# Patient Record
Sex: FEMALE | Race: OTHER | HISPANIC OR LATINO
[De-identification: names, ages, dates, MRNs, and addresses within clinical notes are randomized per-mention and may not be internally consistent; named-entity substitution may affect disease eponyms.]

---

## 2021-02-17 PROBLEM — Z00.00 ENCOUNTER FOR PREVENTIVE HEALTH EXAMINATION: Status: ACTIVE | Noted: 2021-02-17

## 2021-03-08 ENCOUNTER — APPOINTMENT (OUTPATIENT)
Dept: SURGERY | Facility: CLINIC | Age: 46
End: 2021-03-08
Payer: MEDICAID

## 2021-03-08 VITALS
HEIGHT: 65 IN | SYSTOLIC BLOOD PRESSURE: 126 MMHG | TEMPERATURE: 97.4 F | HEART RATE: 80 BPM | OXYGEN SATURATION: 99 % | WEIGHT: 173 LBS | BODY MASS INDEX: 28.82 KG/M2 | DIASTOLIC BLOOD PRESSURE: 81 MMHG

## 2021-03-08 DIAGNOSIS — Z56.0 UNEMPLOYMENT, UNSPECIFIED: ICD-10-CM

## 2021-03-08 DIAGNOSIS — Z78.9 OTHER SPECIFIED HEALTH STATUS: ICD-10-CM

## 2021-03-08 DIAGNOSIS — Z84.89 FAMILY HISTORY OF OTHER SPECIFIED CONDITIONS: ICD-10-CM

## 2021-03-08 PROCEDURE — 99203 OFFICE O/P NEW LOW 30 MIN: CPT

## 2021-03-08 PROCEDURE — 99072 ADDL SUPL MATRL&STAF TM PHE: CPT

## 2021-03-08 SDOH — ECONOMIC STABILITY - INCOME SECURITY: UNEMPLOYMENT, UNSPECIFIED: Z56.0

## 2021-03-09 PROBLEM — Z78.9 NON-SMOKER: Status: ACTIVE | Noted: 2021-03-08

## 2021-03-09 PROBLEM — Z56.0 UNEMPLOYED: Status: ACTIVE | Noted: 2021-03-08

## 2021-03-09 PROBLEM — Z78.9 NO PERTINENT PAST MEDICAL HISTORY: Status: RESOLVED | Noted: 2021-03-08 | Resolved: 2021-03-09

## 2021-03-09 PROBLEM — Z84.89 FAMILY HISTORY OF DEATH OF NATURAL CAUSE: Status: ACTIVE | Noted: 2021-03-08

## 2021-03-09 RX ORDER — NAPROXEN 500 MG/1
500 TABLET ORAL
Qty: 21 | Refills: 0 | Status: ACTIVE | COMMUNITY
Start: 2020-12-18

## 2021-03-09 RX ORDER — NEOMYCIN AND POLYMYXIN B SULFATES AND DEXAMETHASONE 3.5; 10000; 1 MG/G; [IU]/G; MG/G
3.5-10000-0.1 OINTMENT OPHTHALMIC
Qty: 4 | Refills: 0 | Status: ACTIVE | COMMUNITY
Start: 2020-12-15

## 2021-03-09 RX ORDER — CLOBETASOL PROPIONATE 0.5 MG/ML
0.05 SOLUTION TOPICAL
Qty: 50 | Refills: 0 | Status: ACTIVE | COMMUNITY
Start: 2020-12-29

## 2021-03-09 RX ORDER — ONABOTULINUMTOXINA 100 [USP'U]/1
100 INJECTION, POWDER, LYOPHILIZED, FOR SOLUTION INTRADERMAL; INTRAMUSCULAR
Qty: 1 | Refills: 0 | Status: DISCONTINUED | COMMUNITY
Start: 2020-11-10 | End: 2021-03-09

## 2021-03-09 RX ORDER — BACLOFEN 10 MG/1
10 TABLET ORAL
Qty: 60 | Refills: 0 | Status: ACTIVE | COMMUNITY
Start: 2020-09-18

## 2021-03-09 RX ORDER — FLUCONAZOLE 150 MG/1
150 TABLET ORAL
Qty: 10 | Refills: 0 | Status: ACTIVE | COMMUNITY
Start: 2021-02-02

## 2021-03-09 RX ORDER — DICLOFENAC SODIUM 1% 10 MG/G
1 GEL TOPICAL
Qty: 200 | Refills: 0 | Status: DISCONTINUED | COMMUNITY
Start: 2021-03-03 | End: 2021-03-09

## 2021-03-09 RX ORDER — METRONIDAZOLE 7.5 MG/G
0.75 GEL VAGINAL
Qty: 70 | Refills: 0 | Status: ACTIVE | COMMUNITY
Start: 2021-01-14

## 2021-03-09 RX ORDER — APREMILAST 30 MG/1
30 TABLET, FILM COATED ORAL
Qty: 60 | Refills: 0 | Status: ACTIVE | COMMUNITY
Start: 2020-09-10

## 2021-03-09 RX ORDER — HALOBETASOL PROPIONATE 0.5 MG/G
0.05 CREAM TOPICAL
Qty: 50 | Refills: 0 | Status: ACTIVE | COMMUNITY
Start: 2021-03-03

## 2021-03-09 RX ORDER — TIZANIDINE 4 MG/1
4 TABLET ORAL
Qty: 15 | Refills: 0 | Status: ACTIVE | COMMUNITY
Start: 2020-12-18

## 2021-03-09 RX ORDER — TIZANIDINE 2 MG/1
2 TABLET ORAL
Qty: 15 | Refills: 0 | Status: DISCONTINUED | COMMUNITY
Start: 2020-12-18 | End: 2021-03-09

## 2021-03-09 RX ORDER — IBUPROFEN 400 MG/1
400 TABLET, FILM COATED ORAL
Qty: 30 | Refills: 0 | Status: ACTIVE | COMMUNITY
Start: 2020-11-30

## 2021-03-09 RX ORDER — VITAMIN B COMPLEX
CAPSULE ORAL
Qty: 30 | Refills: 0 | Status: ACTIVE | COMMUNITY
Start: 2020-09-09

## 2021-03-09 RX ORDER — FLUOCINONIDE 0.5 MG/ML
0.05 SOLUTION TOPICAL
Qty: 60 | Refills: 0 | Status: ACTIVE | COMMUNITY
Start: 2020-09-14

## 2021-03-09 RX ORDER — FLUTICASONE PROPIONATE 0.5 MG/G
0.05 CREAM TOPICAL
Qty: 60 | Refills: 0 | Status: DISCONTINUED | COMMUNITY
Start: 2020-09-10 | End: 2021-03-09

## 2021-03-09 RX ORDER — AMOXICILLIN 500 MG/1
500 CAPSULE ORAL
Qty: 30 | Refills: 0 | Status: ACTIVE | COMMUNITY
Start: 2020-11-30

## 2021-03-09 RX ORDER — MONTELUKAST 10 MG/1
10 TABLET, FILM COATED ORAL
Qty: 30 | Refills: 0 | Status: ACTIVE | COMMUNITY
Start: 2020-09-09

## 2021-03-09 RX ORDER — GLUCOSAMINE/CHONDR SU A SOD 750-600 MG
1000 TABLET ORAL
Qty: 30 | Refills: 0 | Status: ACTIVE | COMMUNITY
Start: 2020-09-09

## 2021-03-09 RX ORDER — ERGOCALCIFEROL 1.25 MG/1
1.25 MG CAPSULE, LIQUID FILLED ORAL
Qty: 4 | Refills: 0 | Status: ACTIVE | COMMUNITY
Start: 2020-05-15

## 2021-03-09 RX ORDER — PAROXETINE HYDROCHLORIDE 10 MG/1
10 TABLET, FILM COATED ORAL
Qty: 30 | Refills: 0 | Status: ACTIVE | COMMUNITY
Start: 2020-09-09

## 2021-03-09 RX ORDER — MEDICAL SUPPLY, MISCELLANEOUS
EACH MISCELLANEOUS
Qty: 4 | Refills: 0 | Status: ACTIVE | COMMUNITY
Start: 2020-07-24

## 2021-03-09 RX ORDER — AZELASTINE HYDROCHLORIDE 0.5 MG/ML
0.05 SOLUTION/ DROPS OPHTHALMIC
Qty: 6 | Refills: 0 | Status: ACTIVE | COMMUNITY
Start: 2021-01-14

## 2021-03-09 RX ORDER — DICLOFENAC SODIUM 100 MG/1
100 TABLET, FILM COATED, EXTENDED RELEASE ORAL
Qty: 30 | Refills: 0 | Status: ACTIVE | COMMUNITY
Start: 2020-09-18

## 2021-03-15 NOTE — CONSULT LETTER
[Dear  ___] : Dear  [unfilled], [Consult Letter:] : I had the pleasure of evaluating your patient, [unfilled]. [Consult Closing:] : Thank you very much for allowing me to participate in the care of this patient.  If you have any questions, please do not hesitate to contact me. [Sincerely,] : Sincerely, [FreeTextEntry3] : Dr Ratliff

## 2021-03-15 NOTE — PHYSICAL EXAM
[No Rash or Lesion] : No rash or lesion [Alert] : alert [Oriented to Person] : oriented to person [Oriented to Place] : oriented to place [Oriented to Time] : oriented to time [Calm] : calm [de-identified] : The patient is alert, well-groomed, well developed and cheerful.  [de-identified] : Head is normocephalic. Conjunctiva pink, anicteric. Nasal mucosa pink, septum midline. Oral mucosa pink. Tongue midline, pharynx without exudates. \par \par   [de-identified] : Neck supple. Trachea midline. Thyroid isthmus barely palpable, lobes not felt.\par   [de-identified] : Breath sounds equal and bilateral, no wheezing no rales or rhonchi  [de-identified] :  good S1, S2, no m/r/g bilateral  [de-identified] : Breasts are symmetrical. No masses; nipples without discharge. No palpable supraclavicular masses. Axillas are benign [de-identified] : Normoactive bowel sounds, soft and nontender, no hepatosplenomegaly or masses noted [de-identified] : WNL

## 2021-03-15 NOTE — HISTORY OF PRESENT ILLNESS
[de-identified] : NATHANAEL HORVATH is a 45 year old female who present in the office for initial consultation with chief complaint of having a Left breast mass that was found on sonographic finding . Patient was referred by Dr. Ag Palacio and Dr Hoskins. Patient denies any  trauma and she has no nipple discharge. Patient denies any fever, night sweats or loss of appetite. There is no family history of breast carcinoma.  Menarche at age 13 ,  -2 para -2 and her last menstrual period was one year ago. Patient is on no hormonal replacement therapy.  Findings: She had a mammogram on 2021 that  was negative She also had a sonogram of the breast  that was deemed BIRADS 4A - suspicions.  [de-identified] : \par \par \par \par

## 2021-03-15 NOTE — PLAN
[FreeTextEntry1] :  NATHANAEL HORVATH is a 45 year old female who presenting today for an evaluation. she is doing well and offers no complaints. Results of her recent imaging and physical examination findings were discussed in details. She was advised to have breast US in 3 month and return after test. Importance of monthly self-breast examination was reinforced. Patient's questions and concerns addressed to patient's satisfaction.\par \par Vitamin E daily for breast pain \par Avoid caffeine and Chocolates to prevent breast pain.\par

## 2021-08-31 ENCOUNTER — NON-APPOINTMENT (OUTPATIENT)
Age: 46
End: 2021-08-31

## 2021-08-31 DIAGNOSIS — N63.20 UNSPECIFIED LUMP IN THE LEFT BREAST, UNSPECIFIED QUADRANT: ICD-10-CM

## 2021-09-09 ENCOUNTER — APPOINTMENT (OUTPATIENT)
Dept: SURGERY | Facility: CLINIC | Age: 46
End: 2021-09-09
Payer: MEDICAID

## 2021-11-29 ENCOUNTER — APPOINTMENT (OUTPATIENT)
Dept: SURGERY | Facility: CLINIC | Age: 46
End: 2021-11-29
Payer: MEDICAID

## 2021-11-29 VITALS
HEIGHT: 65 IN | HEART RATE: 66 BPM | WEIGHT: 173 LBS | DIASTOLIC BLOOD PRESSURE: 77 MMHG | BODY MASS INDEX: 28.82 KG/M2 | SYSTOLIC BLOOD PRESSURE: 116 MMHG

## 2021-11-29 DIAGNOSIS — R92.8 OTHER ABNORMAL AND INCONCLUSIVE FINDINGS ON DIAGNOSTIC IMAGING OF BREAST: ICD-10-CM

## 2021-11-29 PROCEDURE — 99213 OFFICE O/P EST LOW 20 MIN: CPT

## 2021-11-29 NOTE — PLAN
[FreeTextEntry1] : Ms. HORVATH  is presenting  today for an evaluation .  she is doing well and offers no complaints.  Results of  her recent  imaging and physical examination findings were discussed in details.   She  was advised to have BL            breast US and Mammogram in  February 2022 and return after the tests. Importance of monthly self-breast examination was reinforced.  Patient's questions and concerns addressed to patient's satisfaction.\par

## 2021-11-29 NOTE — HISTORY OF PRESENT ILLNESS
[de-identified] : NATHANAEL HORVATH is a 45 year old female who was seen in March  with chief complaint of having a Left breast mass. She had a mammogram on 02/04/2021 that was negative She also had a sonogram of the breast that was deemed BIRADS 4A . Ms. HORVATH  is s/p sonographic guided fine-needle aspiration of a left breast mass on 02/26/2021 . cytology results were consistent with Uniform ductal cells; favor fibrocystic change. This was concordant with the sonographic appearance.  Ms. HORVATH denies any trauma and she has no nipple discharge. Patient denies any fever, night sweats or loss of appetite.    There is no family history of breast carcinoma.  Her last menstrual period was  1 year ago. .  Patient is on no hormonal replacement therapy.  [de-identified] : Patient reports no interval changes to her overall health status or medical history \par \par

## 2021-11-29 NOTE — DATA REVIEWED
[FreeTextEntry1] : ULTRASOUND GUIDED FINE NEEDLE ASPIRATION LT BREAST 1 LOC\par \par CLINICAL STATEMENT: Increased hypoechoic mass left breast 4:00.\par \par FINDING:\par \par A sonographic guided fine-needle aspiration of a left breast mass was performed. A "timeout" was performed in order to ensure the correct patient and laterality. After the risks, benefits and alternatives of the procedure were discussed with the patient consent was obtained. This service was furnished during a public health emergency. Additional time and supplies were utilized to accommodate the recommended safety protocols.\par \par Preliminary sonographic evaluation of the left breast again reveals a 0.7 x 0.7 x 0.3 cm oval hypoechoic mass left breast 4:00 4 cm from the nipple.\par \par Using sterile technique and 2% lidocaine for local anesthesia a fine needle aspiration biopsy was performed using a 22-gauge needle and sonographic guidance. Less than 1 cc of cloudy, colorless fluid was aspirated, demonstrating complete collapse of the aforementioned lesion, with the material sent for cytologic evaluation.\par \par Patient tolerated the procedure well without complications or complaints. Postprocedural instructions were discussed with the patient.\par \par IMPRESSION:\par \par Sonographic guided fine-needle aspiration left breast mass, compatible with cyst.\par \par CYTOLOGY REPORT:\par \par Left breast 4:00 4 cm from nipple: Uniform ductal cells; favor fibrocystic change. This is concordant with the sonographic appearance. Recommend the follow-up bilateral breast ultrasound in 6 months. Discussed with the patient by telephone.\par \par Thank you for the courtesy of this referral.\par \par Sincerely,\par \par Dictated by: JEFFERY CUMMINS M.D.\par \par Electronically approved by: RAJAN GAINESpar \par MD:/ELÍAS\par \par Transcription Date/Time: 03/04/2021 02:21 PM\par \par CC: CLAUDINE GARCÍA MD\par \par Page PAGE 1 of NUMPAGES 2

## 2021-11-29 NOTE — PHYSICAL EXAM
[Alert] : alert [Oriented to Person] : oriented to person [Oriented to Place] : oriented to place [Oriented to Time] : oriented to time [Calm] : calm [de-identified] : She  is alert, well-groomed, and in NAD\par   [de-identified] : anicteric.  Nasal mucosa pink, septum midline. Oral mucosa pink.  Tongue midline, Pharynx without exudates.\par   [de-identified] : Neck supple. Trachea midline. Thyroid isthmus barely palpable, lobes not felt.\par   [de-identified] : No chest deformity. Breast are symmetric, Normal contours. No nodules, masses, tenderness, or axillary or supraclavicular  adenopathy. No nipple discharge. no skin retraction \par

## 2022-03-14 ENCOUNTER — APPOINTMENT (OUTPATIENT)
Dept: SURGERY | Facility: CLINIC | Age: 47
End: 2022-03-14
Payer: MEDICAID

## 2022-03-14 VITALS
WEIGHT: 172 LBS | DIASTOLIC BLOOD PRESSURE: 83 MMHG | HEART RATE: 58 BPM | HEIGHT: 65 IN | OXYGEN SATURATION: 99 % | SYSTOLIC BLOOD PRESSURE: 124 MMHG | BODY MASS INDEX: 28.66 KG/M2

## 2022-03-14 VITALS — TEMPERATURE: 97.5 F

## 2022-03-14 DIAGNOSIS — N60.09 SOLITARY CYST OF UNSPECIFIED BREAST: ICD-10-CM

## 2022-03-14 PROCEDURE — 99213 OFFICE O/P EST LOW 20 MIN: CPT

## 2022-03-14 NOTE — PHYSICAL EXAM
[Alert] : alert [Oriented to Person] : oriented to person [Oriented to Place] : oriented to place [Oriented to Time] : oriented to time [Calm] : calm [de-identified] : She  is alert, well-groomed, and in NAD\par   [de-identified] : anicteric.  Nasal mucosa pink, septum midline. Oral mucosa pink.  Tongue midline, Pharynx without exudates.\par   [de-identified] : Neck supple. Trachea midline. Thyroid isthmus barely palpable, lobes not felt.\par   [de-identified] : No chest deformity. Breast are symmetric, Normal contours. No nodules, masses, tenderness, or axillary or supraclavicular  adenopathy. No nipple discharge. no skin retraction \par

## 2022-03-14 NOTE — DATA REVIEWED
[FreeTextEntry1] : Patient Name: NATHANAEL HORVATH\par Patient ID: [id number]\par : [id number]\par MRN: [id number]\par Study Date: 2022 09:36:16\par Study Description: [id number] SCREENING DIGITAL MAMMOGRAPHY BILATERAL WITH 3D TO\par _____________________________________________________________________\par \par Report Time:  2022 09:35:00\par Report Status:      Final\par Time Received:     2022 11:25:08\par :           [id number])\par :    ([id number])\par \par ================= Begin of Report Content =================\par \par CLAUDINE GARCÍA MD\par \par [address]\par \par [facility] [address]\par \par RE:NATHANAEL HORVATH DOS:2022\par \par MRN:[id number]\par \par :1975\par \par SCREENING DIGITAL MAMMOGRAPHY BILATERAL WITH 3D [place], ULTRASOUND BREAST BILATERAL\par \par HISTORY: Screening mammogram. Last clinical breast exam 2021.. Status post a left breast FNA on 2021 which revealed uniform ductal cells and was benign.\par \par FINDINGS:\par \par Utilizing Hologic dimensions 3-D digital mammography, 3-D tomosynthesis MLO and CC views of bilateral breasts. Bilateral C-views were performed. R2 image  (computer aided detection) was utilized. Comparison is made with the available prior studies dating back to 2021.\par \par Mammographic breast composition: The breasts are heterogeneously dense, which may obscure small masses. Category C. There are benign punctate and vascular calcifications. The presence of vascular calcifications in a patient less than 50 years of age can be associated with coronary artery disease.\par \par No suspicious mass, architectural distortion or clustered pleomorphic microcalcifications.\par \par Sonography of the bilateral breasts was performed with a high frequency, high resolution transducer, including 2-D grayscale and color flow imaging. Imaging of all 4 quadrants as well as the subareolar and axillary regions was performed. Breast tissue has a homogenous background echotexture.\par \par Right breast:\par \par 2:00 2 cm the nipple 0.3 x 0.2 x 0.3 cm stable cyst;\par \par 6:00 3 cm from the nipple 0.6 x 0.2 x 0.7 cm new cyst;\par \par Dilated subareolar duct consistent with benign ductal ectasia;\par \par Subareolar 9:00 0.6 x 0.3 x 0.6 cm new septated cyst.\par \par Left breast:\par \par 12:00 1 cm from nipple 0.4 x 0.3 x 0.4 cm stable cyst;\par \par 2:00 2 cm from the nipple 0.03 0.2 x 0.4 cm cyst which is decreased in size;\par \par 4:00 4 cm from the nipple 1.0 x 0.4 x 0.7 cm stable cyst;\par \par 4:00 subareolar 0.4 x 0.3 x 0.4 cm stable cyst. There is no region of acoustic shadowing. There is no suspicious sonographically delineated solid or cystic mass.\par \par IMPRESSION RECOMMENDATION:\par \par 1. No suspicious mammographic findings.\par \par 2. Benign bilateral breast ultrasound.\par \par 3. Annual mammogram recommended.\par \par BI-RADS Assessment: Category 2: Benign.\par \par The patient was informed in writing of the results. Patient was entered into a reminder system with a target due date for the next mammogram. The [employer] College [employer] recommends annual physical examination to complement screening mammography. Breast cancer is statistically more likely to occur in women with dense breast tissue. Additional imaging such as breast sonography (if not already performed) should be considered as an adjunctive screening tool in a patient with heterogeneous and or dense glandular tissue.\par \par Thank you for the courtesy of this referral.\par \par Sincerely,\par \par Dictated by: KOLE VÁZQUEZ\par \par Electronically approved by: KOLE VÁZQUEZ\par \par MR:/ELÍAS\par \par Transcription Date/Time: 2022 11:24 AM

## 2022-03-14 NOTE — PLAN
[FreeTextEntry1] : Ms. HORVATH  is presenting  today for an evaluation .  she is doing well and offers no complaints.  Results of  her recent  imaging and physical examination findings were discussed in details.   She  was advised to have BL              breast US and Mammogram in  February 2023 and return after the tests. Importance of monthly self-breast examination was reinforced.  Patient's questions and concerns addressed to patient's satisfaction.\par \par

## 2022-03-14 NOTE — HISTORY OF PRESENT ILLNESS
[de-identified] : This is  a 46 year   old patient presenting today for a breast exam and to discuss the results of her recent  breast imaging. Patient had a BL breast US and   BL breast Mammogram on 02/28/2022. Deemed BIRADS category 2. Ms. HORVATH denies any trauma and she has no nipple discharge. Patient denies any fever, night sweats or loss of appetite.    There is no family history of breast carcinoma. Her last menstrual period was  more than a  year ago.. Patient is on no hormonal replacement therapy. \par  [de-identified] : Patient reports no interval changes to her overall health status or medical history

## 2023-07-25 PROBLEM — Z12.39 SCREENING BREAST EXAMINATION: Status: ACTIVE | Noted: 2022-03-14

## 2023-07-31 ENCOUNTER — APPOINTMENT (OUTPATIENT)
Dept: SURGERY | Facility: CLINIC | Age: 48
End: 2023-07-31
Payer: MEDICAID

## 2023-07-31 VITALS
HEIGHT: 60.24 IN | HEART RATE: 86 BPM | WEIGHT: 182 LBS | DIASTOLIC BLOOD PRESSURE: 79 MMHG | OXYGEN SATURATION: 97 % | SYSTOLIC BLOOD PRESSURE: 115 MMHG | BODY MASS INDEX: 35.26 KG/M2

## 2023-07-31 DIAGNOSIS — Z12.39 ENCOUNTER FOR OTHER SCREENING FOR MALIGNANT NEOPLASM OF BREAST: ICD-10-CM

## 2023-07-31 PROCEDURE — 99213 OFFICE O/P EST LOW 20 MIN: CPT

## 2023-07-31 NOTE — PHYSICAL EXAM
[Alert] : alert [Oriented to Person] : oriented to person [Oriented to Place] : oriented to place [Oriented to Time] : oriented to time [Calm] : calm [de-identified] : She  is alert, well-groomed, and in NAD\par    [de-identified] : anicteric.  Nasal mucosa pink, septum midline. Oral mucosa pink.  Tongue midline, Pharynx without exudates.\par    [de-identified] : Neck supple. Trachea midline. Thyroid isthmus barely palpable, lobes not felt.\par    [de-identified] : No chest deformity. Breast are symmetric, Normal contours. No nodules, masses, tenderness, or axillary or supraclavicular  adenopathy. No nipple discharge. no skin retraction \par

## 2023-07-31 NOTE — DATA REVIEWED
[FreeTextEntry1] : Patient Name\par NATHANAEL HORVATH\par Date of Birth\par 1975\par Procedure\par MA 3D DIGITAL SCREENING MAMMOGRAPHY\par Study Date & Time\par 2023-07-24 9:31 AM\par Patient ID\par [id number]\par Accession Number\par [id number]\par Referring Physician\par CLAUDINE GARCÍA\par Institution Name\par [facility]\par Report\par RADIOLOGY REPORT\par FINDINGS\par FINDING\par Indication: Patient is 47 years old and is seen for screening. The patient has no personal history\par of cancer. The patient has no family history of breast cancer.\par Date of last clinical exam: Longer than one year ago\par Breast Cancer Risk:\par NCI 5 year:0.6%\par NCI Lifetime:6.9%\par Comparison: No prior imaging studies are available for comparison.\par 2D and 3D digital CC and MLO views of both breasts were obtained with reconstructed views. This\par study was interpreted in conjunction with a computer aided detection system.\par Findings:\par The breast tissue is heterogeneously dense, which may obscure small masses. A 0.8 cm oval nodule at\par the 3:00 position of the left breast corresponds with a cluster of cysts on ultrasound. There are\par bilateral scattered coarse benign-appearing calcifications. No significant calcifications or areas\par of architectural distortion are identified. Nonenlarged bilateral axillary lymph nodes are seen.\par Bilateral breast ultrasound was performed around the clock including the retroareolar regions and\par axillae using a high resolution linear array transducer.\par Right breast:\par Scattered subcentimeter simple cysts.\par Left breast:\par 3:00 periareolar, 0.5 cm simple cyst.\par 3:00, 8 cm from the nipple, 0.8 cm cluster of cysts.\par \par 4:00, 4 cm from the nipple, 0.8 cm cluster of cysts.\par Retroareolar duct ectasia.\par There are no enlarged axillary lymph nodes on either side.\par Impression:\par No mammographic or sonographic evidence for malignancy.\par BI-RADS CATEGORY: 2 - Benign\par Recommendation: Routine annual follow up.\par A letter will be sent to the patient with the above recommendations.\par Approximately 10% of breast carcinomas are not radiographically detectable. A negative report should\par not delay biopsy if a clinically suspicious mass is present. Dense breasts may obscure an underlying\par neoplasm.\par Patient has been added into a reminder system with a target due date for their next mammogram.\par BIRADS 1: Negative\par BIRADS 2: Benign\par BIRADS 3: Probably Benign\par BIRADS 4: Suspicious Abnormality - Biopsy should be considered.\par BIRADS 4a: Low Suspicion of Malignancy\par BIRADS 4b: Intermediate Suspicion of Malignancy\par BIRADS 4c: Moderately Suspicious of Malignancy\par BIRADS 5: Highly Suspicious of Malignancy\par BIRADS 6: Known Malignancy\par BIRADS 0: Incomplete - Need Additional Imaging Evaluation and/or Prior Mammograms for Comparison\par The [place] Department of Health requires us to indicate the date of the patient's last\par clinical breast examination.\par Electronically signed by: Ladi Sanchez MD 7/25/2023 9:50 AM\par Workstation: [id number]\par Electronically Signed By: Ladi Sanchez MD\par Sign Date: 25-JUL-23[facility]

## 2023-07-31 NOTE — HISTORY OF PRESENT ILLNESS
[de-identified] : This is  a 47 year   old patient presenting today for a breast exam and to discuss the results of her recent  breast imaging. Patient had a BL breast US and   BL breast Mammogram on 2023. Deemed BIRADS category 2.  Ms. HORVATH denies any trauma and she has no nipple discharge. Patient denies any fever, night sweats or loss of appetite.      PERTINENT HISTORY:  There is no family history of breast carcinoma. Menarche at age 13 ,  -2 para -2.  There is no family history of breast carcinoma, Patient is on no hormonal replacement therapy.  [de-identified] : Patient reports no interval changes to her overall health status or medical history

## 2024-08-27 ENCOUNTER — NON-APPOINTMENT (OUTPATIENT)
Age: 49
End: 2024-08-27

## 2024-08-28 NOTE — DATA REVIEWED
[FreeTextEntry1] : Patient Name NATHANAEL HORVATH Date of Birth 1975 Procedure MA 3D DIGITAL SCREENING MAMMOGRAPHY Study Date & Time 2024-08-19 4:43 PM Patient ID [id number] Accession Number [id number] Referring Physician CLAUDINE GARCÍA Institution Name [facility] Report RADIOLOGY REPORT FINDINGS FINDING Mammogram: Clinical information: Patient is 48 years old and is seen for screening. The patient has no personal history of breast cancer. The patient has no family history of breast cancer. Breast Cancer Risk: NCI 5 year:0.6% NCI Lifetime:6.8% Last reported clinical breast exam: Not available. Comparison: The present examination has been compared to a prior imaging study dated 07/24/2023 (mammogram). 3D digital CC and MLO views of both breasts were obtained. Reconstructed CC and MLO views were obtained. This study was interpreted in conjunction with the [employer] computer aided detection system. The breast tissue is heterogeneously dense, which may obscure small masses. A few benign type calcifications are noted. No dominant mass, suspicious calcifications, or areas of architectural distortion are identified. Bilateral Breast Ultrasound: Comparison: 7/24/2023 and 5/18/2020 Technique: Sonographic evaluation of both breasts in their entirety was performed, including each of the four quadrants, the retroareolar region, and the axilla, in clockwise fashion. Right Breast: No suspicious solid lesion is identified. No abnormal acoustical shadowing is apparent. A few subcentimeter cysts are noted. Left Breast: No suspicious solid lesion is identified. No abnormal acoustical shadowing is apparent. -3:00, 8 cm from the nipple, there is a 0.8 cm cyst cluster, benign. -3:00, 10 cm from the nipple, a low-lying lymph node is visualized, benign. -4:00, 4 cm from the nipple, there is a 0.4 cm complicated cyst, benign. -11:00, areolar margin, 2 subcentimeter cysts are noted. IMPRESSION: No mammographic or sonographic evidence of malignancy. BI-RADS CATEGORY: 2 - Benign Recommendation: Routine annual screening mammogram. A letter will been sent to the patient with the above recommendations. Approximately 10% of breast carcinomas are not radiographically detectable. A negative report should not delay biopsy if a clinically suspicious mass is present. Dense breasts may obscure an underlying neoplasm. Patient has been added into a reminder system with a target due date for their next mammogram. BIRADS 1: Negative BIRADS 2: Benign BIRADS 3: Probably Benign BIRADS 4: Suspicious Abnormality - Biopsy should be considered BIRADS 5: Highly Suspicious of Malignancy BIRADS 6: Known Malignancy BIRADS 0: Incomplete - Need Additional Imaging Evaluation and/or Prior Mammograms for Comparison The [place] Department of Health requires us to indicate the date of the patient's last clinical breast examination. Electronically signed by: Sharon Mayer MD 8/20/2024 7:30 PM Workstation: [id number] Electronically Signed By: Sharon Mayer MD Sign Date: 20-AUG-24 [facility]

## 2024-08-28 NOTE — HISTORY OF PRESENT ILLNESS
[de-identified] : This is  a 48 year   old patient presenting today for a breast exam and to discuss the results of her recent  breast imaging. Patient had a BL breast US and   BL  Mammogram on 2024.  Deemed BIRADS category 2.    PERTINENT HISTORY:  There is no family history of breast carcinoma. Menarche at age 13 ,  -2 para -2.  There is no family history of breast carcinoma, Patient is on no hormonal replacement therapy.

## 2024-09-08 ENCOUNTER — NON-APPOINTMENT (OUTPATIENT)
Age: 49
End: 2024-09-08

## 2024-09-09 ENCOUNTER — APPOINTMENT (OUTPATIENT)
Dept: SURGERY | Facility: CLINIC | Age: 49
End: 2024-09-09
Payer: COMMERCIAL

## 2024-09-09 VITALS
WEIGHT: 183 LBS | HEIGHT: 60.24 IN | BODY MASS INDEX: 35.46 KG/M2 | OXYGEN SATURATION: 97 % | DIASTOLIC BLOOD PRESSURE: 70 MMHG | HEART RATE: 73 BPM | SYSTOLIC BLOOD PRESSURE: 122 MMHG

## 2024-09-09 DIAGNOSIS — Z12.39 ENCOUNTER FOR OTHER SCREENING FOR MALIGNANT NEOPLASM OF BREAST: ICD-10-CM

## 2024-09-09 PROCEDURE — 99213 OFFICE O/P EST LOW 20 MIN: CPT

## 2024-09-09 NOTE — DATA REVIEWED
[FreeTextEntry1] : Patient Name NATHANAEL HORVATH Date of Birth 1975 Procedure MA 3D DIGITAL SCREENING MAMMOGRAPHY Study Date & Time 2024-08-19 4:43 PM Patient ID [id number] Accession Number [id number] Referring Physician CLAUDINE GARCÍA Institution Name [facility] Report RADIOLOGY REPORT FINDINGS FINDING Mammogram: Clinical information: Patient is 48 years old and is seen for screening. The patient has no personal history of breast cancer. The patient has no family history of breast cancer. Breast Cancer Risk: NCI 5 year:0.6% NCI Lifetime:6.8% Last reported clinical breast exam: Not available. Comparison: The present examination has been compared to a prior imaging study dated 07/24/2023 (mammogram). 3D digital CC and MLO views of both breasts were obtained. Reconstructed CC and MLO views were obtained. This study was interpreted in conjunction with the [employer] computer aided detection system. The breast tissue is heterogeneously dense, which may obscure small masses. A few benign type calcifications are noted. No dominant mass, suspicious calcifications, or areas of architectural distortion are identified. Bilateral Breast Ultrasound: Comparison: 7/24/2023 and 5/18/2020 Technique: Sonographic evaluation of both breasts in their entirety was performed, including each of the four quadrants, the retroareolar region, and the axilla, in clockwise fashion. Right Breast: No suspicious solid lesion is identified. No abnormal acoustical shadowing is apparent. A few subcentimeter cysts are noted. Left Breast: No suspicious solid lesion is identified. No abnormal acoustical shadowing is apparent. -3:00, 8 cm from the nipple, there is a 0.8 cm cyst cluster, benign. -3:00, 10 cm from the nipple, a low-lying lymph node is visualized, benign. -4:00, 4 cm from the nipple, there is a 0.4 cm complicated cyst, benign. -11:00, areolar margin, 2 subcentimeter cysts are noted. IMPRESSION: No mammographic or sonographic evidence of malignancy. BI-RADS CATEGORY: 2 - Benign Recommendation: Routine annual screening mammogram. A letter will been sent to the patient with the above recommendations. Approximately 10% of breast carcinomas are not radiographically detectable. A negative report should not delay biopsy if a clinically suspicious mass is present. Dense breasts may obscure an underlying neoplasm. Patient has been added into a reminder system with a target due date for their next mammogram. BIRADS 1: Negative BIRADS 2: Benign BIRADS 3: Probably Benign BIRADS 4: Suspicious Abnormality - Biopsy should be considered BIRADS 5: Highly Suspicious of Malignancy BIRADS 6: Known Malignancy BIRADS 0: Incomplete - Need Additional Imaging Evaluation and/or Prior Mammograms for Comparison The [place] Department of Health requires us to indicate the date of the patient's last clinical breast examination. Electronically signed by: Sharon Mayer MD 8/20/2024 7:30 PM Workstation: [id number] Electronically Signed By: Sharon Mayer MD Sign Date: 20-AUG-24 [facility]

## 2024-09-09 NOTE — PHYSICAL EXAM
[Alert] : alert [Oriented to Person] : oriented to person [Oriented to Place] : oriented to place [Oriented to Time] : oriented to time [Calm] : calm [de-identified] : She  is alert, well-groomed, and in NAD\par    [de-identified] : anicteric.  Nasal mucosa pink, septum midline. Oral mucosa pink.  Tongue midline, Pharynx without exudates.\par    [de-identified] : Neck supple. Trachea midline. Thyroid isthmus barely palpable, lobes not felt.\par    [de-identified] : No chest deformity. Breast are symmetric, Normal contours. No nodules, masses, tenderness, or axillary or supraclavicular  adenopathy. No nipple discharge. no skin retraction \par

## 2024-09-09 NOTE — HISTORY OF PRESENT ILLNESS
[de-identified] : This is  a 48 year   old patient presenting today for a breast exam and to discuss the results of her recent  breast imaging. Patient had a BL breast US and   BL  Mammogram on 2024.  Deemed BIRADS category 2.  Patient reports no interval changes to her overall health status or medical history.  Ms. HORVATH denies any trauma to the breast, denies  skin changes  and   she has no spontaneous nipple discharge. Patient denies any fever, night sweats or loss of appetite.        There is no family history of breast carcinoma.   Patient is on no hormonal replacement therapy.   PERTINENT HISTORY:  There is no family history of breast carcinoma. Menarche at age 13 ,  -2 para -2.    Date of her last menstrual period: 45 years old.

## 2024-09-09 NOTE — PLAN
[FreeTextEntry1] : Ms. HORVATH  is presenting  today for an evaluation .  she is doing well and offers no complaints.  Results of  her last   imaging and physical examination findings were discussed in details.  Significance of the findings were discussed.    She  was advised to have  BL    breast US and Mammogram in  August 2025 and return after the tests. Importance of monthly self-breast examination was reinforced.  Patient's questions and concerns addressed to patient's satisfaction.

## 2024-09-09 NOTE — HISTORY OF PRESENT ILLNESS
[de-identified] : This is  a 48 year   old patient presenting today for a breast exam and to discuss the results of her recent  breast imaging. Patient had a BL breast US and   BL  Mammogram on 2024.  Deemed BIRADS category 2.  Patient reports no interval changes to her overall health status or medical history.  Ms. HORVATH denies any trauma to the breast, denies  skin changes  and   she has no spontaneous nipple discharge. Patient denies any fever, night sweats or loss of appetite.        There is no family history of breast carcinoma.   Patient is on no hormonal replacement therapy.   PERTINENT HISTORY:  There is no family history of breast carcinoma. Menarche at age 13 ,  -2 para -2.    Date of her last menstrual period: 45 years old.

## 2024-09-09 NOTE — DATA REVIEWED
[FreeTextEntry1] : Patient Name NATHANAEL HORVATH Date of Birth 1975 Procedure MA 3D DIGITAL SCREENING MAMMOGRAPHY Study Date & Time 2024-08-19 4:43 PM Patient ID [id number] Accession Number [id number] Referring Physician CLAUDINE GARCÍA Institution Name [id number] Report RADIOLOGY REPORT FINDINGS FINDING Mammogram: Clinical information: Patient is 48 years old and is seen for screening. The patient has no personal history of breast cancer. The patient has no family history of breast cancer. Breast Cancer Risk: NCI 5 year:0.6% NCI Lifetime:6.8% Last reported clinical breast exam: Not available. Comparison: The present examination has been compared to a prior imaging study dated 07/24/2023 (mammogram). 3D digital CC and MLO views of both breasts were obtained. Reconstructed CC and MLO views were obtained. This study was interpreted in conjunction with the [employer] computer aided detection system. The breast tissue is heterogeneously dense, which may obscure small masses. A few benign type calcifications are noted. No dominant mass, suspicious calcifications, or areas of architectural distortion are identified. Bilateral Breast Ultrasound: Comparison: 7/24/2023 and 5/18/2020 Technique: Sonographic evaluation of both breasts in their entirety was performed, including each of the four quadrants, the retroareolar region, and the axilla, in clockwise fashion. Right Breast: No suspicious solid lesion is identified. No abnormal acoustical shadowing is apparent. A few subcentimeter cysts are noted. Left Breast: No suspicious solid lesion is identified. No abnormal acoustical shadowing is apparent. -3:00, 8 cm from the nipple, there is a 0.8 cm cyst cluster, benign. -3:00, 10 cm from the nipple, a low-lying lymph node is visualized, benign. -4:00, 4 cm from the nipple, there is a 0.4 cm complicated cyst, benign. -11:00, areolar margin, 2 subcentimeter cysts are noted. IMPRESSION: No mammographic or sonographic evidence of malignancy. BI-RADS CATEGORY: 2 - Benign Recommendation: Routine annual screening mammogram. A letter will been sent to the patient with the above recommendations. Approximately 10% of breast carcinomas are not radiographically detectable. A negative report should not delay biopsy if a clinically suspicious mass is present. Dense breasts may obscure an underlying neoplasm. Patient has been added into a reminder system with a target due date for their next mammogram. BIRADS 1: Negative BIRADS 2: Benign BIRADS 3: Probably Benign BIRADS 4: Suspicious Abnormality - Biopsy should be considered BIRADS 5: Highly Suspicious of Malignancy BIRADS 6: Known Malignancy BIRADS 0: Incomplete - Need Additional Imaging Evaluation and/or Prior Mammograms for Comparison The [place] Department of Health requires us to indicate the date of the patient's last clinical breast examination. Electronically signed by: Sharon Mayer MD 8/20/2024 7:30 PM Workstation: [id number] Electronically Signed By: Sharon Mayer MD Sign Date: 20-AUG-24 [facility]